# Patient Record
Sex: MALE | Race: WHITE | NOT HISPANIC OR LATINO
[De-identification: names, ages, dates, MRNs, and addresses within clinical notes are randomized per-mention and may not be internally consistent; named-entity substitution may affect disease eponyms.]

---
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
## 2022-09-20 ENCOUNTER — APPOINTMENT (OUTPATIENT)
Dept: OTOLARYNGOLOGY | Facility: CLINIC | Age: 22
End: 2022-09-20

## 2022-09-22 ENCOUNTER — APPOINTMENT (OUTPATIENT)
Dept: OTOLARYNGOLOGY | Facility: CLINIC | Age: 22
End: 2022-09-22

## 2022-10-06 ENCOUNTER — APPOINTMENT (OUTPATIENT)
Dept: OTOLARYNGOLOGY | Facility: CLINIC | Age: 22
End: 2022-10-06

## 2022-10-06 VITALS
HEART RATE: 85 BPM | WEIGHT: 207 LBS | BODY MASS INDEX: 29.63 KG/M2 | SYSTOLIC BLOOD PRESSURE: 122 MMHG | HEIGHT: 70 IN | DIASTOLIC BLOOD PRESSURE: 82 MMHG

## 2022-10-06 DIAGNOSIS — J34.2 DEVIATED NASAL SEPTUM: ICD-10-CM

## 2022-10-06 DIAGNOSIS — J32.9 CHRONIC SINUSITIS, UNSPECIFIED: ICD-10-CM

## 2022-10-06 PROCEDURE — 99204 OFFICE O/P NEW MOD 45 MIN: CPT

## 2022-10-06 NOTE — HISTORY OF PRESENT ILLNESS
[de-identified] : CHRONIC SINUSITIS/ NASAL OBSTRUCTION/\par ALLERGIES FOR MANY TYEARS\par MEDICAL HX REVIEWED\par ALLERGY TO HIS CAT AND DOG

## 2022-10-06 NOTE — PHYSICAL EXAM
[] : septum deviated to the left [de-identified] : NASAL MUCOSAL IRRITATION/ CONGESTION [Normal] : mucosa is normal [Midline] : trachea located in midline position

## 2022-10-06 NOTE — REVIEW OF SYSTEMS
[Sneezing] : sneezing [Seasonal Allergies] : seasonal allergies [Post Nasal Drip] : post nasal drip [Dizziness] : dizziness [Nasal Congestion] : nasal congestion [Nose Bleeds] : nose bleeds [Recurrent Sinus Infections] : recurrent sinus infections [Sinus Pressure] : sinus pressure [Sense Of Smell Problem] : sense of smell problem [Discolored Nasal Discharge] : discolored nasal discharge [Eyes Itch] : itching of the eyes [Negative] : Heme/Lymph [Patient Intake Form Reviewed] : Patient intake form was reviewed [de-identified] : Hives [de-identified] : Headache  [FreeTextEntry1] : Daytime sleepiness

## 2022-10-06 NOTE — ASSESSMENT
[FreeTextEntry1] : NASAL SALINE SPRAY\par EUCALYPTUS HUMIDIFIER\par BREATHRIGHT STRIPS\par ALLERGY DISCUSSED\par SEPTOPLASTY DISCUSSED\par CT SINUS\par F/U AFTER CT

## 2022-10-11 ENCOUNTER — APPOINTMENT (OUTPATIENT)
Dept: CT IMAGING | Facility: CLINIC | Age: 22
End: 2022-10-11

## 2022-10-11 PROCEDURE — 70486 CT MAXILLOFACIAL W/O DYE: CPT

## 2022-11-03 ENCOUNTER — APPOINTMENT (OUTPATIENT)
Dept: OTOLARYNGOLOGY | Facility: CLINIC | Age: 22
End: 2022-11-03